# Patient Record
(demographics unavailable — no encounter records)

---

## 2025-07-09 NOTE — HISTORY OF PRESENT ILLNESS
[LMP unknown] : LMP unknown [N] : Patient does not use contraception [Y] : Positive pregnancy history [unknown] : Patient is unsure of the date of her LMP [Menarche Age: ____] : age at menarche was [unfilled] [No] : Patient does not have concerns regarding sex [Previously active] : previously active